# Patient Record
Sex: MALE | Race: OTHER | NOT HISPANIC OR LATINO | URBAN - METROPOLITAN AREA
[De-identification: names, ages, dates, MRNs, and addresses within clinical notes are randomized per-mention and may not be internally consistent; named-entity substitution may affect disease eponyms.]

---

## 2020-07-17 ENCOUNTER — EMERGENCY (EMERGENCY)
Facility: HOSPITAL | Age: 35
LOS: 1 days | End: 2020-07-17
Admitting: EMERGENCY MEDICINE
Payer: MEDICAID

## 2020-07-17 PROCEDURE — 99285 EMERGENCY DEPT VISIT HI MDM: CPT

## 2021-07-23 PROBLEM — Z00.00 ENCOUNTER FOR PREVENTIVE HEALTH EXAMINATION: Status: ACTIVE | Noted: 2021-07-23

## 2021-07-28 ENCOUNTER — APPOINTMENT (OUTPATIENT)
Dept: ORTHOPEDIC SURGERY | Facility: CLINIC | Age: 36
End: 2021-07-28
Payer: MEDICAID

## 2021-07-28 VITALS
DIASTOLIC BLOOD PRESSURE: 63 MMHG | SYSTOLIC BLOOD PRESSURE: 103 MMHG | HEART RATE: 44 BPM | WEIGHT: 148 LBS | HEIGHT: 72 IN | BODY MASS INDEX: 20.05 KG/M2

## 2021-07-28 DIAGNOSIS — F12.90 CANNABIS USE, UNSPECIFIED, UNCOMPLICATED: ICD-10-CM

## 2021-07-28 DIAGNOSIS — Z82.62 FAMILY HISTORY OF OSTEOPOROSIS: ICD-10-CM

## 2021-07-28 DIAGNOSIS — Z82.61 FAMILY HISTORY OF ARTHRITIS: ICD-10-CM

## 2021-07-28 DIAGNOSIS — Z78.9 OTHER SPECIFIED HEALTH STATUS: ICD-10-CM

## 2021-07-28 DIAGNOSIS — F17.200 NICOTINE DEPENDENCE, UNSPECIFIED, UNCOMPLICATED: ICD-10-CM

## 2021-07-28 DIAGNOSIS — Z80.9 FAMILY HISTORY OF MALIGNANT NEOPLASM, UNSPECIFIED: ICD-10-CM

## 2021-07-28 PROCEDURE — 73130 X-RAY EXAM OF HAND: CPT | Mod: LT

## 2021-07-28 PROCEDURE — 99203 OFFICE O/P NEW LOW 30 MIN: CPT

## 2021-07-28 RX ORDER — FLUOXETINE HYDROCHLORIDE 40 MG/1
40 CAPSULE ORAL
Refills: 0 | Status: ACTIVE | COMMUNITY

## 2021-07-28 RX ORDER — CLONAZEPAM 2 MG/1
TABLET ORAL
Refills: 0 | Status: ACTIVE | COMMUNITY

## 2021-07-28 RX ORDER — NAPROXEN 500 MG/1
500 TABLET ORAL
Refills: 0 | Status: ACTIVE | COMMUNITY

## 2021-07-28 RX ORDER — BUPRENORPHINE HCL/NALOXONE HCL 8 MG-2 MG
TABLET, SUBLINGUAL SUBLINGUAL
Refills: 0 | Status: ACTIVE | COMMUNITY

## 2021-07-28 NOTE — END OF VISIT
[FreeTextEntry3] : This note was written by Kasey Tapia on 07/28/2021 acting solely as a scribe for Dr. Clark Kincaid.\par  \par All medical record entries made by the Scribe were at my, Dr. Clark Kincaid, direction and personally dictated by me on 07/28/2021. I have personally reviewed the chart and agree that the record accurately reflects my personal performance of the history, physical exam, assessment and plan.

## 2021-07-28 NOTE — ADDENDUM
[FreeTextEntry1] : I, Kasey Tapia, acted solely as a scribe for Dr. Kincaid on this date on 07/28/2021.

## 2021-07-28 NOTE — PHYSICAL EXAM
[de-identified] : - Constitutional: This is a male in no obvious distress. He is accompanied by his mother today.\par - Psych: Patient is alert and oriented to person, place and time.  Patient has a normal mood and affect.\par - Cardiovascular: Normal pulses throughout the upper extremities.  No significant varicosities are noted in the upper extremities. \par - Neuro: Strength and sensation are intact throughout the upper extremities.  Patient has normal coordination.\par - Respiratory:  Patient exhibits no evidence of shortness of breath or difficulty breathing.\par - Skin: No rashes, lesions, or other abnormalities are noted in the upper extremities.\par \par --- \par \par Examination of his left hand demonstrates swelling at the MCP joint of the index finger.  He is tender in this region.  He has limitation of motion.  His flexor and extensor tendons are intact.  It is difficult to assess stability of the MCP joint collateral ligaments, given voluntary guarding.  He is neurovascularly intact distally. [de-identified] : AP, lateral and oblique radiographs of his left hand demonstrate no obvious fractures or arthritis.
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
## 2021-07-28 NOTE — DISCUSSION/SUMMARY
[FreeTextEntry1] : He has findings consistent with what appears to be a left index finger MCP joint sprain with residual symptoms, status post injury 3 months ago.\par \par I had a discussion with the patient and his mother regarding today's visit, the prognosis of this diagnosis, and treatment recommendations and options. At this time, I recommended that he obtain a copy of the MRI disc and report for my review.  In addition I recommended he obtain any relevant office notes from the previous hand surgeon.  He will follow up in this office after obtaining the requested documents. \par \par He has agreed to the above plan of management and has expressed full understanding.  All questions were fully answered to their satisfaction. \par \par My cumulative time spent on this visit included: Preparation for the visit, review of the medical records, review of pertinent diagnostic studies, examination and counseling of the patient on the above diagnosis, treatment plan and prognosis, orders of diagnostic tests, medication and/or appropriate procedures and documentation in the medical records of today's visit.

## 2021-07-29 ENCOUNTER — NON-APPOINTMENT (OUTPATIENT)
Age: 36
End: 2021-07-29

## 2021-08-06 ENCOUNTER — NON-APPOINTMENT (OUTPATIENT)
Age: 36
End: 2021-08-06

## 2021-08-06 ENCOUNTER — APPOINTMENT (OUTPATIENT)
Dept: ORTHOPEDIC SURGERY | Facility: CLINIC | Age: 36
End: 2021-08-06
Payer: MEDICAID

## 2021-08-06 VITALS — HEIGHT: 72 IN | WEIGHT: 148 LBS | BODY MASS INDEX: 20.05 KG/M2

## 2021-08-06 PROCEDURE — 99214 OFFICE O/P EST MOD 30 MIN: CPT
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
## 2021-08-06 NOTE — END OF VISIT
[FreeTextEntry3] : This note was written by Kasey Tapia on 08/06/2021 acting solely as a scribe for Dr. Clark Kincaid.\par  \par All medical record entries made by the Scribe were at my, Dr. Clark Kincaid, direction and personally dictated by me on 08/06/2021. I have personally reviewed the chart and agree that the record accurately reflects my personal performance of the history, physical exam, assessment and plan.

## 2021-08-06 NOTE — ADDENDUM
[FreeTextEntry1] : I, Kasey Tapia, acted solely as a scribe for Dr. Kincaid on this date on 08/06/2021.

## 2021-08-25 ENCOUNTER — OUTPATIENT (OUTPATIENT)
Dept: OUTPATIENT SERVICES | Facility: HOSPITAL | Age: 36
LOS: 1 days | End: 2021-08-25
Payer: MEDICAID

## 2021-08-25 ENCOUNTER — APPOINTMENT (OUTPATIENT)
Dept: MRI IMAGING | Facility: CLINIC | Age: 36
End: 2021-08-25
Payer: MEDICAID

## 2021-08-25 DIAGNOSIS — S63.651A SPRAIN OF METACARPOPHALANGEAL JOINT OF LEFT INDEX FINGER, INITIAL ENCOUNTER: ICD-10-CM

## 2021-08-25 PROCEDURE — 73218 MRI UPPER EXTREMITY W/O DYE: CPT

## 2021-08-25 PROCEDURE — 73218 MRI UPPER EXTREMITY W/O DYE: CPT | Mod: 26,LT

## 2021-09-07 ENCOUNTER — NON-APPOINTMENT (OUTPATIENT)
Age: 36
End: 2021-09-07

## 2021-09-15 ENCOUNTER — APPOINTMENT (OUTPATIENT)
Dept: ORTHOPEDIC SURGERY | Facility: CLINIC | Age: 36
End: 2021-09-15
Payer: MEDICAID

## 2021-09-15 PROCEDURE — 20600 DRAIN/INJ JOINT/BURSA W/O US: CPT | Mod: F1

## 2021-09-15 PROCEDURE — 99214 OFFICE O/P EST MOD 30 MIN: CPT | Mod: 25

## 2021-09-15 RX ADMIN — BETAMETHASONE SODIUM PHOSPHATE AND BETAMETHASONE ACETATE 1 MG/ML: 3; 3 INJECTION, SUSPENSION INTRA-ARTICULAR; INTRALESIONAL; INTRAMUSCULAR; SOFT TISSUE at 00:00

## 2021-09-15 NOTE — END OF VISIT
[FreeTextEntry3] : This note was written by Kasey Tapia on 09/15/2021 acting solely as a scribe for Dr. Clark Kincaid.\par  \par All medical record entries made by the Scribe were at my, Dr. Clark Kincaid, direction and personally dictated by me on 09/15/2021. I have personally reviewed the chart and agree that the record accurately reflects my personal performance of the history, physical exam, assessment and plan.

## 2021-09-15 NOTE — ADDENDUM
[FreeTextEntry1] : I, Kasey Tapia, acted solely as a scribe for Dr. Kincaid on this date on 09/15/2021.

## 2021-09-15 NOTE — PROCEDURE
[FreeTextEntry1] : - After a discussion of risks and benefits, the patient agreed to proceed with a cortisone injection.  \par -  Side Injected: Left index finger MCP joint\par -  Medications injected: 1cc betamethasone, using sterile technique.\par -  Patient tolerated the procedure well, without complications.\par -  Patient noted immediate relief of the symptoms, secondary to the anesthetic effects of the injection.\par -  Patient was told that the pain may worsen for a day or two, and should then begin to improve.\par -  Instructions: The patient was instructed on the use of ice, anti-inflammatory agents, or Tylenol, and activity modification.\par -  Follow-up: Within 2 weeks to assess the response to the injection.

## 2021-09-15 NOTE — PHYSICAL EXAM
[de-identified] : - Constitutional: This is a male in no obvious distress. He is accompanied by his mother today.\par - Psych: Patient is alert and oriented to person, place and time.  Patient has a normal mood and affect.\par - Cardiovascular: Normal pulses throughout the upper extremities.  No significant varicosities are noted in the upper extremities. \par - Neuro: Strength and sensation are intact throughout the upper extremities.  Patient has normal coordination.\par - Respiratory:  Patient exhibits no evidence of shortness of breath or difficulty breathing.\par - Skin: No rashes, lesions, or other abnormalities are noted in the upper extremities.\par \par --- \par \par Examination of his left hand demonstrates persistent swelling at the MCP joint of the index finger.  He is tender in this region.  He has full extension with improved flexion.  His flexor and extensor tendons are intact.  There is no obvious instability of the MCP joint collateral ligaments.  In addition, there is no obvious subluxation of the extensor tendon with flexion and extension of the finger.  He remains neurovascularly intact distally. [de-identified] : I reviewed the MRI of his left hand from 8/25/2021.  This demonstrated a partial tear of the radial sagittal band of the left index finger and a low-grade sprain of the ulnar sagittal band at the level of the second MCP joint.  Minimal ulnar subluxation of the extensor mechanism at the level of the MCP joint.  Findings similar to prior study.  Focal edema within the second metacarpal head, improved compared to the prior study.  Low-grade sprain of the radial and ulnar collateral ligaments at the level of the second MCP joint.  Extensor tenosynovitis at the level of second MCP joint.\par \par I reviewed the MRI of his left hand from 5/26/21.  This demonstrated full-thickness tearing of the dorsal ulnar sagittal band at the second MCP where there is severe soft tissue swelling, moderate extensor tenosynovitis, moderate ulnar collateral ligament sprain and mild radial collateral ligament sprain with mild patchy areas of marrow edema in the head of the second metacarpal and base of the proximal phalanx of the second digit likely related to contusions without malalignment or full-thickness tendon discontinuity.\par \par Previous AP, lateral and oblique radiographs of his left hand demonstrated no obvious fractures or arthritis.
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
## 2021-09-15 NOTE — DISCUSSION/SUMMARY
[FreeTextEntry1] : I reviewed the MRI results with him.  I had a discussion regarding today's visit, the diagnosis and treatment recommendations and options.  We also discussed changes since the last visit.\par \par At this time, we discussed treatment options of hand therapy versus a cortisone injection. He has agreed to proceed with a cortisone injection into the MCP joint of the left index finger. He has deferred a referral to hand therapy at this time.  He understands that this may not provide with long-term relief, and if the palpable he may require surgery in the future.  However, as there is no evidence of a complete collateral ligament rupture or complete tear of the sagittal band, I would not recommend surgery, as he has persistent swelling and inflammation and surgery would likely result in worsening of the swelling and stiffness.\par \par The patient has agreed to the above plan of management and has expressed full understanding.  All questions were fully answered to the patient's satisfaction.\par \par My cumulative time spent on today's visit was greater than 30 minutes and included: Preparation for the visit, review of the medical records, review of pertinent diagnostic studies, examination and counseling of the patient on the above diagnosis, treatment plan and prognosis, orders of diagnostic tests, medications and/or appropriate procedures and documentation in the medical records of today's visit.

## 2021-09-16 RX ORDER — BETAMETHA AC,SOD PHOS/WATER/PF 6 MG/ML
6 (3-3) VIAL (ML) INJECTION
Qty: 1 | Refills: 0 | Status: COMPLETED | OUTPATIENT
Start: 2021-09-15

## 2021-09-20 ENCOUNTER — NON-APPOINTMENT (OUTPATIENT)
Age: 36
End: 2021-09-20

## 2021-09-29 ENCOUNTER — APPOINTMENT (OUTPATIENT)
Dept: ORTHOPEDIC SURGERY | Facility: CLINIC | Age: 36
End: 2021-09-29

## 2022-01-03 ENCOUNTER — NON-APPOINTMENT (OUTPATIENT)
Age: 37
End: 2022-01-03

## 2022-01-12 ENCOUNTER — APPOINTMENT (OUTPATIENT)
Dept: ORTHOPEDIC SURGERY | Facility: CLINIC | Age: 37
End: 2022-01-12

## 2022-01-27 ENCOUNTER — NON-APPOINTMENT (OUTPATIENT)
Age: 37
End: 2022-01-27

## 2022-01-28 PROBLEM — S63.651A: Status: ACTIVE | Noted: 2021-07-28

## 2022-02-04 ENCOUNTER — APPOINTMENT (OUTPATIENT)
Dept: ORTHOPEDIC SURGERY | Facility: CLINIC | Age: 37
End: 2022-02-04
Payer: MEDICAID

## 2022-02-04 DIAGNOSIS — S63.651A SPRAIN OF METACARPOPHALANGEAL JOINT OF LEFT INDEX FINGER, INITIAL ENCOUNTER: ICD-10-CM

## 2022-02-04 PROCEDURE — 99213 OFFICE O/P EST LOW 20 MIN: CPT

## 2022-02-04 NOTE — DISCUSSION/SUMMARY
[FreeTextEntry1] : I had a discussion regarding today's visit, the diagnosis and treatment recommendations and options.  We also discussed changes since the last visit.  At this time, I told him and his mother that there is nothing surgically that I would recommend doing based upon his MRI. Additionally I told him at this point I would recommend that he wean off of the brace. I additionally wouldn't recommend another injection given that too much cortisone can be detrimental. Finally I did recommend he continue with hand therapy and that I would encourage him to see a physician out in [place] as he is living out there at this time. He will follow up in 1 to 2 months if needed, according to his symptoms.\par \par The patient has agreed to the above plan of management and has expressed full understanding.  All questions were fully answered to the patient's satisfaction.\par \par My cumulative time spent on today's visit was greater than 30 minutes and included: Preparation for the visit, review of the medical records, review of pertinent diagnostic studies, examination and counseling of the patient on the above diagnosis, treatment plan and prognosis, orders of diagnostic tests, medications and/or appropriate procedures and documentation in the medical records of today's visit.

## 2022-02-04 NOTE — ADDENDUM
[FreeTextEntry1] : I, Kasey Tapia, acted solely as a scribe for Dr. Kincaid on this date on 02/04/2022.

## 2022-02-04 NOTE — END OF VISIT
[FreeTextEntry3] : This note was written by Kasey Tapia on 02/04/2022 acting solely as a scribe for Dr. Clark Kincaid.\par  \par All medical record entries made by the Scribe were at my, Dr. Clark Kincaid, direction and personally dictated by me on 02/04/2022. I have personally reviewed the chart and agree that the record accurately reflects my personal performance of the history, physical exam, assessment and plan.
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
## 2022-02-04 NOTE — PHYSICAL EXAM
[de-identified] : - Constitutional: This is a male in no obvious distress. He is accompanied by his mother and another female today.\par - Psych: Patient is alert and oriented to person, place and time.  Patient has a normal mood and affect.\par - Cardiovascular: Normal pulses throughout the upper extremities.  No significant varicosities are noted in the upper extremities. \par - Neuro: Strength and sensation are intact throughout the upper extremities.  Patient has normal coordination.\par - Respiratory:  Patient exhibits no evidence of shortness of breath or difficulty breathing.\par - Skin: No rashes, lesions, or other abnormalities are noted in the upper extremities.\par \par --- \par \par Examination of his left hand demonstrates mild swelling at the MCP joint of the index finger.  He has some residual tenderness in this region.  He has full flexion and extension.  There is mild pain with motion.  His flexor and extensor tendons are intact.  There is no obvious instability of the MCP joint collateral ligaments.  In addition, there is no obvious subluxation of the extensor tendon with flexion and extension of the finger.  He remains neurovascularly intact distally. [de-identified] : An MRI of his left hand from 8/25/2021 demonstrated a partial tear of the radial sagittal band of the left index finger and a low-grade sprain of the ulnar sagittal band at the level of the second MCP joint.  Minimal ulnar subluxation of the extensor mechanism at the level of the MCP joint.  Findings similar to prior study.  Focal edema within the second metacarpal head, improved compared to the prior study.  Low-grade sprain of the radial and ulnar collateral ligaments at the level of the second MCP joint.  Extensor tenosynovitis at the level of second MCP joint.\par \par I reviewed the MRI of his left hand from 5/26/21.  This demonstrated full-thickness tearing of the dorsal ulnar sagittal band at the second MCP where there is severe soft tissue swelling, moderate extensor tenosynovitis, moderate ulnar collateral ligament sprain and mild radial collateral ligament sprain with mild patchy areas of marrow edema in the head of the second metacarpal and base of the proximal phalanx of the second digit likely related to contusions without malalignment or full-thickness tendon discontinuity.\par \par Previous AP, lateral and oblique radiographs of his left hand demonstrated no obvious fractures or arthritis.

## 2022-03-04 DIAGNOSIS — M25.542 PAIN IN JOINTS OF LEFT HAND: ICD-10-CM

## 2022-09-12 ENCOUNTER — APPOINTMENT (OUTPATIENT)
Dept: ORTHOPEDIC SURGERY | Facility: CLINIC | Age: 37
End: 2022-09-12

## 2022-09-12 VITALS — HEIGHT: 71 IN | BODY MASS INDEX: 22.4 KG/M2 | WEIGHT: 160 LBS

## 2022-09-12 DIAGNOSIS — J45.909 UNSPECIFIED ASTHMA, UNCOMPLICATED: ICD-10-CM

## 2022-09-12 DIAGNOSIS — S43.432A SUPERIOR GLENOID LABRUM LESION OF LEFT SHOULDER, INITIAL ENCOUNTER: ICD-10-CM

## 2022-09-12 PROCEDURE — 99214 OFFICE O/P EST MOD 30 MIN: CPT

## 2022-09-12 NOTE — IMAGING
[de-identified] : \par ------------------------------------------------------------------------------------------------------------------------------------------------------\par \par Left shoulder exam: \par \par Inspection: no obvious deformity, no obvious masses, no swelling, no effusion, no atrophy\par ROM: \par    FF: 170\par    ER: 50\par    IR: T12\par Tenderness:\par    +Anterior/Biceps: \par    (-) Posterior\par    (-) Lateral\par    (-) Trapezius\par    (-) Scapula\par    (-) AC joint\par    +Crepitus with ROM\par Stability: \par    +Translation anterior/posterior\par    +Apprehension\par    +Clicking\par Additional tests: \par    +Neer's\par    (-) Hawkin's\par    (-) Easton's\par    (-) Speed\par    (-) Cross chest adduction\par Strength: \par    FF: 5/5\par    ER: 5/5\par    IR: 5/5\par    Biceps: 5/5\par    Triceps: 5/5\par    Distal: 5/5\par Neuro: In tact to light touch throughout\par Vascularity: Extremity warm and well perfused\par \par
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
## 2022-09-12 NOTE — DISCUSSION/SUMMARY
[de-identified] : discused options. he had good result with PT and iac in the past but relief was temporary. now clicking and popping and recurrent pain. patient would likely benefit from surgical labral repair. discussed he will have to consult with pain managemnt to consider postoperative pain control. he wishes to do PT for now. he will consider surgery. fu 6 weeks. \par \par ------------------------------------------------------------------------------------------------------------------------------------------------------\par \par The patient was advised of the diagnosis.  The natural history of the pathology was explained in full. All questions were answered.  The risks and benefits of conservative and interventional treatment alternatives were explained to the patient\par

## 2022-10-24 ENCOUNTER — APPOINTMENT (OUTPATIENT)
Dept: ORTHOPEDIC SURGERY | Facility: CLINIC | Age: 37
End: 2022-10-24

## 2022-11-16 ENCOUNTER — FORM ENCOUNTER (OUTPATIENT)
Age: 37
End: 2022-11-16

## 2023-01-16 ENCOUNTER — FORM ENCOUNTER (OUTPATIENT)
Age: 38
End: 2023-01-16